# Patient Record
Sex: FEMALE | Race: WHITE | Employment: OTHER | URBAN - METROPOLITAN AREA
[De-identification: names, ages, dates, MRNs, and addresses within clinical notes are randomized per-mention and may not be internally consistent; named-entity substitution may affect disease eponyms.]

---

## 2018-07-05 ENCOUNTER — HOSPITAL ENCOUNTER (EMERGENCY)
Age: 73
Discharge: HOME | End: 2018-07-05
Attending: EMERGENCY MEDICINE
Payer: MEDICARE

## 2018-07-05 ENCOUNTER — APPOINTMENT (OUTPATIENT)
Dept: GENERAL RADIOLOGY | Age: 73
End: 2018-07-05
Attending: EMERGENCY MEDICINE
Payer: MEDICARE

## 2018-07-05 VITALS
DIASTOLIC BLOOD PRESSURE: 56 MMHG | TEMPERATURE: 98.9 F | RESPIRATION RATE: 20 BRPM | WEIGHT: 190 LBS | OXYGEN SATURATION: 98 % | HEIGHT: 59 IN | BODY MASS INDEX: 38.3 KG/M2 | HEART RATE: 90 BPM | SYSTOLIC BLOOD PRESSURE: 107 MMHG

## 2018-07-05 DIAGNOSIS — J20.8 ACUTE BRONCHITIS DUE TO OTHER SPECIFIED ORGANISMS: Primary | ICD-10-CM

## 2018-07-05 LAB
ANION GAP SERPL CALC-SCNC: 8 MMOL/L (ref 3–18)
BASOPHILS # BLD: 0 K/UL (ref 0–0.06)
BASOPHILS NFR BLD: 0 % (ref 0–3)
BUN SERPL-MCNC: 27 MG/DL (ref 7–18)
BUN/CREAT SERPL: 18 (ref 12–20)
CALCIUM SERPL-MCNC: 8.9 MG/DL (ref 8.5–10.1)
CHLORIDE SERPL-SCNC: 100 MMOL/L (ref 100–108)
CO2 SERPL-SCNC: 29 MMOL/L (ref 21–32)
CREAT SERPL-MCNC: 1.49 MG/DL (ref 0.6–1.3)
DIFFERENTIAL METHOD BLD: ABNORMAL
EOSINOPHIL # BLD: 0 K/UL (ref 0–0.4)
EOSINOPHIL NFR BLD: 0 % (ref 0–5)
ERYTHROCYTE [DISTWIDTH] IN BLOOD BY AUTOMATED COUNT: 14 % (ref 11.6–14.5)
GLUCOSE SERPL-MCNC: 207 MG/DL (ref 74–99)
HCT VFR BLD AUTO: 37.1 % (ref 35–45)
HGB BLD-MCNC: 12.4 G/DL (ref 12–16)
LYMPHOCYTES # BLD: 1.5 K/UL (ref 0.8–3.5)
LYMPHOCYTES NFR BLD: 13 % (ref 20–51)
MCH RBC QN AUTO: 30.4 PG (ref 24–34)
MCHC RBC AUTO-ENTMCNC: 33.4 G/DL (ref 31–37)
MCV RBC AUTO: 90.9 FL (ref 74–97)
MONOCYTES # BLD: 0.2 K/UL (ref 0–1)
MONOCYTES NFR BLD: 2 % (ref 2–9)
NEUTS SEG # BLD: 10 K/UL (ref 1.8–8)
NEUTS SEG NFR BLD: 85 % (ref 42–75)
PLATELET # BLD AUTO: 260 K/UL (ref 135–420)
PLATELET COMMENTS,PCOM: ABNORMAL
PMV BLD AUTO: 9.3 FL (ref 9.2–11.8)
POTASSIUM SERPL-SCNC: 4 MMOL/L (ref 3.5–5.5)
RBC # BLD AUTO: 4.08 M/UL (ref 4.2–5.3)
RBC MORPH BLD: ABNORMAL
SODIUM SERPL-SCNC: 137 MMOL/L (ref 136–145)
WBC # BLD AUTO: 11.7 K/UL (ref 4.6–13.2)

## 2018-07-05 PROCEDURE — 99282 EMERGENCY DEPT VISIT SF MDM: CPT

## 2018-07-05 PROCEDURE — 85025 COMPLETE CBC W/AUTO DIFF WBC: CPT | Performed by: EMERGENCY MEDICINE

## 2018-07-05 PROCEDURE — 80048 BASIC METABOLIC PNL TOTAL CA: CPT | Performed by: EMERGENCY MEDICINE

## 2018-07-05 PROCEDURE — 71046 X-RAY EXAM CHEST 2 VIEWS: CPT

## 2018-07-05 NOTE — DISCHARGE INSTRUCTIONS
Bronchitis: Care Instructions  Your Care Instructions    Bronchitis is inflammation of the bronchial tubes, which carry air to the lungs. The tubes swell and produce mucus, or phlegm. The mucus and inflamed bronchial tubes make you cough. You may have trouble breathing. Most cases of bronchitis are caused by viruses like those that cause colds. Antibiotics usually do not help and they may be harmful. Bronchitis usually develops rapidly and lasts about 2 to 3 weeks in otherwise healthy people. Follow-up care is a key part of your treatment and safety. Be sure to make and go to all appointments, and call your doctor if you are having problems. It's also a good idea to know your test results and keep a list of the medicines you take. How can you care for yourself at home? · Take all medicines exactly as prescribed. Call your doctor if you think you are having a problem with your medicine. · Get some extra rest.  · Take an over-the-counter pain medicine, such as acetaminophen (Tylenol), ibuprofen (Advil, Motrin), or naproxen (Aleve) to reduce fever and relieve body aches. Read and follow all instructions on the label. · Do not take two or more pain medicines at the same time unless the doctor told you to. Many pain medicines have acetaminophen, which is Tylenol. Too much acetaminophen (Tylenol) can be harmful. · Take an over-the-counter cough medicine that contains dextromethorphan to help quiet a dry, hacking cough so that you can sleep. Avoid cough medicines that have more than one active ingredient. Read and follow all instructions on the label. · Breathe moist air from a humidifier, hot shower, or sink filled with hot water. The heat and moisture will thin mucus so you can cough it out. · Do not smoke. Smoking can make bronchitis worse. If you need help quitting, talk to your doctor about stop-smoking programs and medicines. These can increase your chances of quitting for good.   When should you call for help? Call 911 anytime you think you may need emergency care. For example, call if:  ? · You have severe trouble breathing. ?Call your doctor now or seek immediate medical care if:  ? · You have new or worse trouble breathing. ? · You cough up dark brown or bloody mucus (sputum). ? · You have a new or higher fever. ? · You have a new rash. ? Watch closely for changes in your health, and be sure to contact your doctor if:  ? · You cough more deeply or more often, especially if you notice more mucus or a change in the color of your mucus. ? · You are not getting better as expected. Where can you learn more? Go to http://zheng-mariella.info/. Enter H333 in the search box to learn more about \"Bronchitis: Care Instructions. \"  Current as of: May 12, 2017  Content Version: 11.4  © 4071-9241 [employer]. Care instructions adapted under license by [employer] (which disclaims liability or warranty for this information). If you have questions about a medical condition or this instruction, always ask your healthcare professional. [address] any warranty or liability for your use of this information.

## 2018-07-05 NOTE — ED TRIAGE NOTES
Pt presents with cough and fever x 2-3 days. States seen at [facility] yesterday for same. X-ray performed and given abx rx. States fever this am 102 took 600mg ibuprofen at around 6:30am today.

## 2018-07-05 NOTE — ED NOTES
Sharla Mansfield is a 67 y.o. female that was discharged in stable. Pt was accompanied by self. Pt is driving. The patients diagnosis, condition and treatment were explained to  patient and aftercare instructions were given. The patient verbalized understanding. Patient armband removed and shredded.

## 2018-07-05 NOTE — ED PROVIDER NOTES
EMERGENCY DEPARTMENT HISTORY AND PHYSICAL EXAM    11:34 AM      Date: 7/5/2018  Patient Name: Josette Richardson    History of Presenting Illness     Chief Complaint   Patient presents with    Cough    Fever         History Provided By: Patient    Chief Complaint: Cough  Duration: 3-4 Days  Timing:  Constant  Location: NA  Quality: Dry   Severity: Moderate  Modifying Factors: Antibiotics, cough syrup, and Ibuprofen have not improved Sx  Associated Symptoms: fever, nausea, and vomiting      Additional History (Context): Josette Richardson is a 67 y.o. female with PMHx of HTN who presents with constant, moderate dry cough associated with fever, nausea, and vomiting onset 3-4 days ago. Sx were not improved by antibiotics or cough syrup. 3-4 days ago, the pt began coughing. X 1 day ago she went to [facility], and was prescribed a Rx for Abx., of which she took two yesterday, and cough syrup. Last night, she had nausea, vomiting, and became febrile, running a fever of 101-102.5. She awoke this morning and took x 2 Ibuprofen  5 1/2 hours ago. Patient denies sputum in cough, pain, SOB. No tobacco use, but notes \"she got enough of that when she was growing up\". Denies any further complaints or symptoms at the moment. PCP: Ita Oleary MD    Current Outpatient Prescriptions   Medication Sig Dispense Refill    montelukast sodium (SINGULAIR PO) Take  by mouth.  metformin HCl (METFORMIN PO) Take  by mouth.  LOSARTAN PO Take  by mouth. Past History     Past Medical History:  Past Medical History:   Diagnosis Date    H/O seasonal allergies     Hypertension     Pre-diabetes        Past Surgical History:  Past Surgical History:   Procedure Laterality Date    HX HERNIA REPAIR      HX HYSTERECTOMY         Family History:  No family history on file.     Social History:  Social History   Substance Use Topics    Smoking status: Passive Smoke Exposure - Never Smoker    Smokeless tobacco: Not on file    Alcohol use No       Allergies:  No Known Allergies      Review of Systems       Review of Systems   Constitutional: Positive for fever. Negative for chills and fatigue. HENT: Negative. Negative for ear pain and sore throat. Eyes: Negative. Respiratory: Positive for cough. Negative for shortness of breath. Cardiovascular: Negative for chest pain and palpitations. Gastrointestinal: Positive for nausea and vomiting. Negative for abdominal pain. Genitourinary: Negative for dysuria. Musculoskeletal: Negative. Negative for generalized pain anywhere in body     Skin: Negative. Neurological: Negative for dizziness, weakness, light-headedness and headaches. Psychiatric/Behavioral: Negative. All other systems reviewed and are negative. Physical Exam     Visit Vitals    /56 (BP 1 Location: Left arm, BP Patient Position: Sitting)    Pulse 90    Temp 98.9 °F (37.2 °C)    Resp 20    Ht 4' 11\" (1.499 m)    Wt 86.2 kg (190 lb)    SpO2 98%    BMI 38.38 kg/m2         Physical Exam   Constitutional: She is oriented to person, place, and time. She appears well-developed and well-nourished. Patient is alert and oriented but with frequent cough   HENT:   Head: Normocephalic and atraumatic. Mouth/Throat: Oropharynx is clear and moist.   Eyes: Conjunctivae are normal. Pupils are equal, round, and reactive to light. No scleral icterus. Neck: Normal range of motion. Neck supple. No JVD present. No tracheal deviation present. Cardiovascular: Normal rate, regular rhythm and normal heart sounds. Pulmonary/Chest: Effort normal and breath sounds normal. No respiratory distress. She has no wheezes. Abdominal: Soft. Bowel sounds are normal.   Musculoskeletal: Normal range of motion. Neurological: She is alert and oriented to person, place, and time. She has normal strength. Gait normal. GCS eye subscore is 4. GCS verbal subscore is 5. GCS motor subscore is 6.    Skin: Skin is warm and dry.   Psychiatric: She has a normal mood and affect. Nursing note and vitals reviewed. Diagnostic Study Results     Labs -  Recent Results (from the past 12 hour(s))   METABOLIC PANEL, BASIC    Collection Time: 07/05/18 11:45 AM   Result Value Ref Range    Sodium 137 136 - 145 mmol/L    Potassium 4.0 3.5 - 5.5 mmol/L    Chloride 100 100 - 108 mmol/L    CO2 29 21 - 32 mmol/L    Anion gap 8 3.0 - 18 mmol/L    Glucose 207 (H) 74 - 99 mg/dL    BUN 27 (H) 7.0 - 18 MG/DL    Creatinine 1.49 (H) 0.6 - 1.3 MG/DL    BUN/Creatinine ratio 18 12 - 20      GFR est AA 42 (L) >60 ml/min/1.73m2    GFR est non-AA 34 (L) >60 ml/min/1.73m2    Calcium 8.9 8.5 - 10.1 MG/DL   CBC WITH AUTOMATED DIFF    Collection Time: 07/05/18 11:45 AM   Result Value Ref Range    WBC 11.7 4.6 - 13.2 K/uL    RBC 4.08 (L) 4.20 - 5.30 M/uL    HGB 12.4 12.0 - 16.0 g/dL    HCT 37.1 35.0 - 45.0 %    MCV 90.9 74.0 - 97.0 FL    MCH 30.4 24.0 - 34.0 PG    MCHC 33.4 31.0 - 37.0 g/dL    RDW 14.0 11.6 - 14.5 %    PLATELET 174 624 - 355 K/uL    MPV 9.3 9.2 - 11.8 FL    NEUTROPHILS 85 (H) 42 - 75 %    LYMPHOCYTES 13 (L) 20 - 51 %    MONOCYTES 2 2 - 9 %    EOSINOPHILS 0 0 - 5 %    BASOPHILS 0 0 - 3 %    ABS. NEUTROPHILS 10.0 (H) 1.8 - 8.0 K/UL    ABS. LYMPHOCYTES 1.5 0.8 - 3.5 K/UL    ABS. MONOCYTES 0.2 0 - 1.0 K/UL    ABS. EOSINOPHILS 0.0 0.0 - 0.4 K/UL    ABS. BASOPHILS 0.0 0.0 - 0.06 K/UL    DF MANUAL      PLATELET COMMENTS ADEQUATE PLATELETS      RBC COMMENTS NORMOCYTIC, NORMOCHROMIC         Radiologic Studies -   XR CHEST PA LAT   Final Result   IMPRESSION  Impression:  Small amount of haziness retrocardiac region may represent atelectasis versus  infiltrate         Medical Decision Making   I am the first provider for this patient. I reviewed the vital signs, available nursing notes, past medical history, past surgical history, family history and social history. Vital Signs-Reviewed the patient's vital signs.     Pulse Oximetry Analysis -  98% on room air, normal    Records Reviewed: Nursing Notes (Time of Review: 11:34 AM)    ED Course: Progress Notes, Reevaluation, and Consults:    Provider Notes (Medical Decision Making):       Diagnosis     Clinical Impression:   1. Acute bronchitis due to other specified organisms        Disposition: Discharge    Follow-up Information     Follow up With Details Comments [id number] Dylon Egan MD Schedule an appointment as soon as possible for a visit in 1 week If symptoms worsen [facility] EMERGENCY DEPT Go to As needed, If symptoms worsen [facility]  [phone]           Patient's Medications   Start Taking    No medications on file   Continue Taking    LOSARTAN PO    Take  by mouth. METFORMIN HCL (METFORMIN PO)    Take  by mouth. MONTELUKAST SODIUM (SINGULAIR PO)    Take  by mouth. These Medications have changed    No medications on file   Stop Taking    No medications on file     _______________________________    Attestations:  Vladislav Atkins (Aj) acting as a scribe for and in the presence of Pierre Gilmore MD      July 05, 2018 at 11:34 AM       Provider Attestation:      I personally performed the services described in the documentation, reviewed the documentation, as recorded by the scribe in my presence, and it accurately and completely records my words and actions.  July 05, 2018 at 11:34 AM - Pierre Gilmore MD    _______________________________

## 2019-02-02 ENCOUNTER — HOSPITAL ENCOUNTER (EMERGENCY)
Age: 74
Discharge: HOME | End: 2019-02-02
Attending: EMERGENCY MEDICINE
Payer: MEDICARE

## 2019-02-02 VITALS
HEART RATE: 111 BPM | HEIGHT: 59 IN | TEMPERATURE: 97.8 F | SYSTOLIC BLOOD PRESSURE: 181 MMHG | DIASTOLIC BLOOD PRESSURE: 73 MMHG | WEIGHT: 199 LBS | OXYGEN SATURATION: 100 % | RESPIRATION RATE: 16 BRPM | BODY MASS INDEX: 40.12 KG/M2

## 2019-02-02 DIAGNOSIS — H65.192 OTHER ACUTE NONSUPPURATIVE OTITIS MEDIA OF LEFT EAR, RECURRENCE NOT SPECIFIED: Primary | ICD-10-CM

## 2019-02-02 DIAGNOSIS — J30.9 ALLERGIC RHINITIS, UNSPECIFIED SEASONALITY, UNSPECIFIED TRIGGER: ICD-10-CM

## 2019-02-02 PROCEDURE — 99282 EMERGENCY DEPT VISIT SF MDM: CPT

## 2019-02-02 RX ORDER — LOSARTAN POTASSIUM AND HYDROCHLOROTHIAZIDE 12.5; 5 MG/1; MG/1
1 TABLET ORAL DAILY
COMMUNITY

## 2019-02-02 RX ORDER — AMOXICILLIN 500 MG/1
500 TABLET, FILM COATED ORAL 2 TIMES DAILY
Qty: 14 TAB | Refills: 0 | Status: SHIPPED | OUTPATIENT
Start: 2019-02-02 | End: 2019-02-09

## 2019-02-02 RX ORDER — METFORMIN HYDROCHLORIDE 500 MG/1
500 TABLET ORAL
COMMUNITY

## 2019-02-02 RX ORDER — MONTELUKAST SODIUM 10 MG/1
10 TABLET ORAL DAILY
COMMUNITY

## 2019-02-02 NOTE — DISCHARGE INSTRUCTIONS
Patient Education        Allergies: Care Instructions  Your Care Instructions    Allergies occur when your body's defense system (immune system) overreacts to certain substances. The immune system treats a harmless substance as if it were a harmful germ or virus. Many things can cause this overreaction, including pollens, medicine, food, dust, animal dander, and mold. Allergies can be mild or severe. Mild allergies can be managed with home treatment. But medicine may be needed to prevent problems. Managing your allergies is an important part of staying healthy. Your doctor may suggest that you have allergy testing to help find out what is causing your allergies. When you know what things trigger your symptoms, you can avoid them. This can prevent allergy symptoms and other health problems. For severe allergies that cause reactions that affect your whole body (anaphylactic reactions), your doctor may prescribe a shot of epinephrine to carry with you in case you have a severe reaction. Learn how to give yourself the shot and keep it with you at all times. Make sure it is not . Follow-up care is a key part of your treatment and safety. Be sure to make and go to all appointments, and call your doctor if you are having problems. It's also a good idea to know your test results and keep a list of the medicines you take. How can you care for yourself at home? · If you have been told by your doctor that dust or dust mites are causing your allergy, decrease the dust around your bed:  ? Wash sheets, pillowcases, and other bedding in hot water every week. ? Use dust-proof covers for pillows, duvets, and mattresses. Avoid plastic covers because they tear easily and do not \"breathe. \" Wash as instructed on the label. ? Do not use any blankets and pillows that you do not need. ? Use blankets that you can wash in your washing machine. ?  Consider removing drapes and carpets, which attract and hold dust, from your bedroom. · If you are allergic to house dust and mites, do not use home humidifiers. Your doctor can suggest ways you can control dust and mites. · Look for signs of cockroaches. Cockroaches cause allergic reactions. Use cockroach baits to get rid of them. Then, clean your home well. Cockroaches like areas where grocery bags, newspapers, empty bottles, or cardboard boxes are stored. Do not keep these inside your home, and keep trash and food containers sealed. Seal off any spots where cockroaches might enter your home. · If you are allergic to mold, get rid of furniture, rugs, and drapes that smell musty. Check for mold in the bathroom. · If you are allergic to outdoor pollen or mold spores, use air-conditioning. Change or clean all filters every month. Keep windows closed. · If you are allergic to pollen, stay inside when pollen counts are high. Use a vacuum  with a HEPA filter or a double-thickness filter at least two times each week. · Stay inside when air pollution is bad. Avoid paint fumes, perfumes, and other strong odors. · Avoid conditions that make your allergies worse. Stay away from smoke. Do not smoke or let anyone else smoke in your house. Do not use fireplaces or wood-burning stoves. · If you are allergic to your pets, change the air filter in your furnace every month. Use high-efficiency filters. · If you are allergic to pet dander, keep pets outside or out of your bedroom. Old carpet and cloth furniture can hold a lot of animal dander. You may need to replace them. When should you call for help? Give an epinephrine shot if:    · You think you are having a severe allergic reaction.     · You have symptoms in more than one body area, such as mild nausea and an itchy mouth.    After giving an epinephrine shot call 911, even if you feel better.   Call 911 if:    · You have symptoms of a severe allergic reaction.  These may include:  ? Sudden raised, red areas (hives) all over your body.  ? Swelling of the throat, mouth, lips, or tongue. ? Trouble breathing. ? Passing out (losing consciousness). Or you may feel very lightheaded or suddenly feel weak, confused, or restless.     · You have been given an epinephrine shot, even if you feel better.    Call your doctor now or seek immediate medical care if:    · You have symptoms of an allergic reaction, such as:  ? A rash or hives (raised, red areas on the skin). ? Itching. ? Swelling. ? Belly pain, nausea, or vomiting.    Watch closely for changes in your health, and be sure to contact your doctor if:    · You do not get better as expected. Where can you learn more? Go to http://zheng-mariella.info/. Enter L721 in the search box to learn more about \"Allergies: Care Instructions. \"  Current as of: June 27, 2018  Content Version: 11.9  © 1400-5237 [employer]. Care instructions adapted under license by [employer] (which disclaims liability or warranty for this information). If you have questions about a medical condition or this instruction, always ask your healthcare professional. [place] [address] any warranty or liability for your use of this information.

## 2019-02-02 NOTE — ED PROVIDER NOTES
EMERGENCY DEPARTMENT HISTORY AND PHYSICAL EXAM 
 
9:36 AM 
 
 
Date: 2/2/2019 Patient Name: Penny Prieto History of Presenting Illness Chief Complaint Patient presents with  Ear Pain History Provided By: Patient Chief Complaint: Ear Pain Duration:  Days Timing:  Constant Location: Left ear Quality: N/A Severity: N/A Modifying Factors: None Associated Symptoms: Cough; Rhinorrhea; Sneezing Additional History (Context): . Penny Prieto is a 68 y.o. female with PMHx of HTN and seasonal allergies presenting to the ED c/o constant left ear pain for the past 2 days. Pt also c/o constant cough, rhinorrhea, and sneezing for the past few days, states these symptoms started after being exposed to wood burning at home. States she is taking robitussin with improvement in her cough, rhinorrhea, and sneezing but states she is still experiencing ear pain. Reports her doctor took her off of Claritin 3 months ago. Denies any other symptoms or complaints. PCP: Tasha Puente MD 
 
 
Past History Past Medical History: 
Past Medical History:  
Diagnosis Date  H/O seasonal allergies  Hypertension  Pre-diabetes Past Surgical History: 
Past Surgical History:  
Procedure Laterality Date  HX HERNIA REPAIR    
 HX HYSTERECTOMY Family History: 
History reviewed. No pertinent family history. Social History: 
Social History Tobacco Use  Smoking status: Passive Smoke Exposure - Never Smoker Substance Use Topics  Alcohol use: No  
 Drug use: No  
 
 
Allergies: Allergies Allergen Reactions  Allopurinol Unknown (comments)  Lisinopril Cough Review of Systems Review of Systems Constitutional: Negative for chills and fever. HENT: Positive for ear pain, rhinorrhea and sneezing. Negative for sore throat and trouble swallowing. Eyes: Negative for visual disturbance. Respiratory: Positive for cough. Negative for shortness of breath and wheezing. Cardiovascular: Negative for chest pain. Gastrointestinal: Negative for abdominal pain, nausea and vomiting. Endocrine: Negative for polyuria. Genitourinary: Negative for dysuria. Musculoskeletal: Negative for arthralgias and neck stiffness. Skin: Negative for pallor and rash. Neurological: Negative for dizziness, weakness, numbness and headaches. Hematological: Does not bruise/bleed easily. Psychiatric/Behavioral: Negative for confusion and dysphoric mood. All other systems reviewed and are negative. Physical Exam  
 
Visit Vitals /73 (BP 1 Location: Left arm, BP Patient Position: At rest) Pulse (!) 111 Temp 97.8 °F (36.6 °C) Resp 16 Ht 4' 11\" (1.499 m) Wt 90.3 kg (199 lb) SpO2 100% BMI 40.19 kg/m² Physical Exam  
Constitutional: She is oriented to person, place, and time. She appears well-developed and well-nourished. No distress. HENT:  
Head: Normocephalic and atraumatic. Nose: Rhinorrhea present. Mouth/Throat: Oropharynx is clear and moist.  
Erythema in left ear canal, also redness in TM which appears to have a slight bulging. Eyes: Conjunctivae are normal. Pupils are equal, round, and reactive to light. No scleral icterus. Neck: Normal range of motion. Neck supple. Cardiovascular: Normal rate and intact distal pulses. Capillary refill < 3 seconds Pulmonary/Chest: Effort normal. No respiratory distress. slight cough and sniffles at the bedside. Musculoskeletal: Normal range of motion. She exhibits no edema. Lymphadenopathy:  
     Head (left side): No preauricular adenopathy present. Neurological: She is alert and oriented to person, place, and time. No cranial nerve deficit. Skin: Skin is warm and dry. She is not diaphoretic. Psychiatric: Her behavior is normal.  
Nursing note and vitals reviewed. Diagnostic Study Results Labs - 
 No results found for this or any previous visit (from the past 12 hour(s)). Radiologic Studies - No orders to display Medical Decision Making I am the first provider for this patient. I reviewed the vital signs, available nursing notes, past medical history, past surgical history, family history and social history. Vital Signs-Reviewed the patient's vital signs. Pulse Oximetry Analysis -  100% on room air, normal  
 
Records Reviewed: Nursing Notes and Old Medical Records (Time of Review: 9:36 AM) Provider Notes (Medical Decision Making): MDM Number of Diagnoses or Management Options Allergic rhinitis, unspecified seasonality, unspecified trigger: Other acute nonsuppurative otitis media of left ear, recurrence not specified:  
Diagnosis management comments: Likely allergic rhinitis symptoms, states symptoms started with wood burning at home. Was taken off of Claritin. Could be URI or other lung infection as well. Robitussin is helping. Pt refuses chest X-ray, states she had a CT workup just recently of her chest and she states no nothing acute on it. Will give antibiotic for ear infection. Pt states she will restart her Claritin she has at home. Medications - No data to display ED Course: Progress Notes, Reevaluation, and Consults: Fu pcp, return to ED if any worsening sx's. Px amoxicillin Diagnosis Clinical Impression: 1. Other acute nonsuppurative otitis media of left ear, recurrence not specified 2. Allergic rhinitis, unspecified seasonality, unspecified trigger Disposition: Discharged Follow-up Information Follow up With Specialties Details Why Contact Info Kareem Pisano MD Internal Medicine Schedule an appointment as soon as possible for a visit in 1 week  [address] [place] [id number] 
[phone] [address] [facility] EMERGENCY DEPT Emergency Medicine  As needed, If symptoms worsen [place] [place] [address] 
[phone] Medication List  
  
START taking these medications   
amoxicillin 500 mg Tab Take 500 mg by mouth two (2) times a day for 7 days. ASK your doctor about these medications ALBUTEROL IN 
  
losartan-hydroCHLOROthiazide 50-12.5 mg per tablet Commonly known as:  HYZAAR 
  
metFORMIN 500 mg tablet Commonly known as:  GLUCOPHAGE 
  
SINGULAIR 10 mg tablet Generic drug:  montelukast 
  
  
  
Where to Get Your Medications Information about where to get these medications is not yet available Ask your nurse or doctor about these medications · amoxicillin 500 mg Tab 
  
 
_______________________________ Attestations: 
Scribe Attestation Darlene Aparicio acting as a scribe for and in the presence of Surekha Bryant DO February 02, 2019 at 9:36 AM 
    
Provider Attestation:     
I personally performed the services described in the documentation, reviewed the documentation, as recorded by the scribe in my presence, and it accurately and completely records my words and actions. February 02, 2019 at 9:36 AM - Isaiah Montaño DO   
_______________________________